# Patient Record
Sex: FEMALE | Race: OTHER | Employment: UNEMPLOYED | URBAN - METROPOLITAN AREA
[De-identification: names, ages, dates, MRNs, and addresses within clinical notes are randomized per-mention and may not be internally consistent; named-entity substitution may affect disease eponyms.]

---

## 2024-04-12 ENCOUNTER — HOSPITAL ENCOUNTER (EMERGENCY)
Age: 39
Discharge: HOME | End: 2024-04-12
Attending: EMERGENCY MEDICINE
Payer: MEDICAID

## 2024-04-12 VITALS
BODY MASS INDEX: 31.28 KG/M2 | OXYGEN SATURATION: 99 % | SYSTOLIC BLOOD PRESSURE: 133 MMHG | HEIGHT: 62 IN | RESPIRATION RATE: 16 BRPM | DIASTOLIC BLOOD PRESSURE: 88 MMHG | HEART RATE: 86 BPM | TEMPERATURE: 98.8 F | WEIGHT: 170 LBS

## 2024-04-12 DIAGNOSIS — K04.7 DENTAL INFECTION: Primary | ICD-10-CM

## 2024-04-12 PROCEDURE — 99283 EMERGENCY DEPT VISIT LOW MDM: CPT

## 2024-04-12 RX ORDER — PENICILLIN V POTASSIUM 500 MG/1
500 TABLET ORAL 4 TIMES DAILY
Qty: 40 TABLET | Refills: 0 | Status: SHIPPED | OUTPATIENT
Start: 2024-04-12

## 2024-04-12 RX ORDER — PENICILLIN V POTASSIUM 500 MG/1
500 TABLET ORAL 4 TIMES DAILY
Qty: 40 TABLET | Refills: 0 | Status: SHIPPED | OUTPATIENT
Start: 2024-04-12 | End: 2024-04-12

## 2024-04-12 RX ORDER — IBUPROFEN 800 MG/1
800 TABLET ORAL EVERY 8 HOURS PRN
Qty: 30 TABLET | Refills: 0 | Status: SHIPPED | OUTPATIENT
Start: 2024-04-12 | End: 2024-04-12

## 2024-04-12 RX ORDER — IBUPROFEN 800 MG/1
800 TABLET ORAL EVERY 8 HOURS PRN
Qty: 30 TABLET | Refills: 0 | Status: SHIPPED | OUTPATIENT
Start: 2024-04-12

## 2024-04-12 ASSESSMENT — PAIN SCALES - GENERAL: PAINLEVEL_OUTOF10: 10

## 2024-04-12 ASSESSMENT — PAIN DESCRIPTION - LOCATION: LOCATION: TEETH

## 2024-04-12 ASSESSMENT — PAIN - FUNCTIONAL ASSESSMENT: PAIN_FUNCTIONAL_ASSESSMENT: 0-10

## 2024-04-12 ASSESSMENT — PAIN DESCRIPTION - ORIENTATION: ORIENTATION: RIGHT

## 2024-04-12 NOTE — DISCHARGE INSTRUCTIONS
Take antibiotic as directed    Take Motrin as directed as needed for pain    Cool compresses to the area    Rinse mouth out several times with warm salt water    Use Magic mouthwash as directed    Follow-up with your dentist on April 28 as scheduled    If your symptoms worsen or any new or concerning symptoms arise please return to emergency department immediately  
No assistance needed

## 2024-04-12 NOTE — ED PROVIDER NOTES
[facility] Emergency Department  [address]  [facility] [address]  Phone: [phone]  Fax: [phone]      Attending Physician Attestation    Based on the medical record, the care appears appropriate. I was personally available for consultation in the Emergency Department. I did have a discussion with our midlevel provider regarding the care of this patient.  I reviewed the mid level provider's note and agree with the documented findings and plan of care.   I have reviewed the emergency nurses triage note. I agree with the chief complaint, past medical history, past surgical history, allergies, medications, social and family history as documented unless otherwise noted below.       CHIEF COMPLAINT       Chief Complaint   Patient presents with    Dental Pain     Pt arrives with co right upper tooth pain x 3 days. Pt unable to get into dentist          PAST MEDICAL HISTORY    has a past medical history of Bipolar disorder (HCC), Lumbago, Nephrolithiasis, Obesity, and Syncope, cardiogenic.    SURGICAL HISTORY      has a past surgical history that includes  section and Dilation and curettage of uterus.    CURRENT MEDICATIONS       Current Discharge Medication List          ALLERGIES     has No Known Allergies.      FINAL IMPRESSION      1. Dental infection          DISPOSITION/PLAN   DISPOSITION Decision To Discharge 2024 12:00:05 PM      Condition on Disposition    Improved    PATIENT REFERRED TO:  YOUR DENTIST ON THE 28 as scheduled      KEEP DENTIST APPOINTMENT      DISCHARGE MEDICATIONS:  Current Discharge Medication List        START taking these medications    Details   ibuprofen (IBU) 800 MG tablet Take 1 tablet by mouth every 8 hours as needed for Pain  Qty: 30 tablet, Refills: 0      Magic Mouthwash (MIRACLE MOUTHWASH) Swish and spit 5 mLs 4 times daily as needed for Irritation  Qty: 240 mL, Refills: 0      penicillin v potassium (VEETID) 500 MG tablet Take 1

## 2024-04-12 NOTE — ED PROVIDER NOTES
[facility] EMERGENCY DEPARTMENT  EMERGENCY DEPARTMENT ENCOUNTER      Pt Name: Susannah Caba  MRN: [id number]  Birthdate 1985  Date of evaluation: 4/12/2024  Provider: NICKO Ryan CNP    CHIEF COMPLAINT       Chief Complaint   Patient presents with    Dental Pain     Pt arrives with co right upper tooth pain x 3 days. Pt unable to get into dentist          HISTORY OF PRESENT ILLNESS   (Location/Symptom, Timing/Onset, Context/Setting, Quality, Duration, Modifying Factors, Severity)  Note limiting factors.   Susannah Caba is a 38 y.o. female who presents to the emergency department for evaluation of right upper jaw/molar dental pain.  Patient states her symptoms have been ongoing for the past 3 days, but she noticed today that she had some facial swelling.  She states she has been unable to get into the dentist, but she does have an appointment coming up 4/28/2024.  She states she has been using NyQuil to treat the discomfort and rinsing with Listerine.  Denies any fevers or chills.        Nursing Notes were reviewed.    REVIEW OF SYSTEMS       Constitutional: No fevers or chills   HEENT: No sore throat, rhinorrhea, or earache + dental pain  Eyes: No blurry vision or double vision no drainage   Cardiovascular: No chest pain or tachycardia   Respiratory: No wheezing or shortness of breath no cough   Gastrointestinal: No nausea, vomiting, diarrhea, constipation, or abdominal pain   : No hematuria or dysuria   Musculoskeletal: No swelling or pain   Skin: No rash   Neurological: No focal neurologic complaints, paresthesias, weakness, or headache      Except as noted above the remainder of the review of systems was reviewed and negative.       PAST MEDICAL HISTORY     Past Medical History:   Diagnosis Date    Bipolar disorder (HCC)     Lumbago     Nephrolithiasis     Obesity     Syncope, cardiogenic        SURGICAL HISTORY       Past Surgical History:   Procedure Laterality Date

## 2024-05-17 ENCOUNTER — OFFICE VISIT (OUTPATIENT)
Dept: FAMILY MEDICINE CLINIC | Age: 39
End: 2024-05-17
Payer: MEDICAID

## 2024-05-17 VITALS
SYSTOLIC BLOOD PRESSURE: 118 MMHG | HEART RATE: 69 BPM | OXYGEN SATURATION: 100 % | BODY MASS INDEX: 33.49 KG/M2 | DIASTOLIC BLOOD PRESSURE: 76 MMHG | HEIGHT: 62 IN | WEIGHT: 182 LBS

## 2024-05-17 DIAGNOSIS — Z13.29 THYROID DISORDER SCREEN: ICD-10-CM

## 2024-05-17 DIAGNOSIS — E55.9 VITAMIN D DEFICIENCY: ICD-10-CM

## 2024-05-17 DIAGNOSIS — E53.8 VITAMIN B12 DEFICIENCY: ICD-10-CM

## 2024-05-17 DIAGNOSIS — Z86.39 HISTORY OF HYPERGLYCEMIA: ICD-10-CM

## 2024-05-17 DIAGNOSIS — E66.09 CLASS 1 OBESITY DUE TO EXCESS CALORIES WITHOUT SERIOUS COMORBIDITY WITH BODY MASS INDEX (BMI) OF 33.0 TO 33.9 IN ADULT: Chronic | ICD-10-CM

## 2024-05-17 DIAGNOSIS — Z13.0 SCREENING FOR IRON DEFICIENCY ANEMIA: ICD-10-CM

## 2024-05-17 DIAGNOSIS — Z76.89 ESTABLISHING CARE WITH NEW DOCTOR, ENCOUNTER FOR: Primary | ICD-10-CM

## 2024-05-17 DIAGNOSIS — K21.9 GASTROESOPHAGEAL REFLUX DISEASE WITHOUT ESOPHAGITIS: Chronic | ICD-10-CM

## 2024-05-17 DIAGNOSIS — Z13.220 SCREENING FOR HYPERLIPIDEMIA: ICD-10-CM

## 2024-05-17 DIAGNOSIS — Z13.6 SCREENING FOR CARDIOVASCULAR CONDITION: ICD-10-CM

## 2024-05-17 PROBLEM — M51.369 DEGENERATION OF LUMBAR INTERVERTEBRAL DISC: Status: ACTIVE | Noted: 2024-05-17

## 2024-05-17 PROBLEM — Z80.0 FAMILY HISTORY OF MALIGNANT NEOPLASM OF COLON: Status: ACTIVE | Noted: 2019-05-03

## 2024-05-17 PROBLEM — M47.816 ARTHRITIS OF FACET JOINT OF LUMBAR SPINE: Status: ACTIVE | Noted: 2024-05-17

## 2024-05-17 PROBLEM — M51.36 DEGENERATION OF LUMBAR INTERVERTEBRAL DISC: Status: ACTIVE | Noted: 2024-05-17

## 2024-05-17 PROBLEM — Z84.89: Status: ACTIVE | Noted: 2019-05-03

## 2024-05-17 PROBLEM — Q76.49 BERTOLOTTI'S SYNDROME: Status: ACTIVE | Noted: 2024-05-17

## 2024-05-17 PROCEDURE — 99204 OFFICE O/P NEW MOD 45 MIN: CPT

## 2024-05-17 RX ORDER — PHENTERMINE HYDROCHLORIDE 37.5 MG/1
37.5 CAPSULE ORAL EVERY MORNING
Qty: 30 CAPSULE | Refills: 0 | Status: SHIPPED | OUTPATIENT
Start: 2024-05-17 | End: 2024-06-16

## 2024-05-17 RX ORDER — OMEPRAZOLE 20 MG/1
20 CAPSULE, DELAYED RELEASE ORAL
Qty: 30 CAPSULE | Refills: 0 | Status: SHIPPED | OUTPATIENT
Start: 2024-05-17

## 2024-05-17 SDOH — ECONOMIC STABILITY: INCOME INSECURITY: HOW HARD IS IT FOR YOU TO PAY FOR THE VERY BASICS LIKE FOOD, HOUSING, MEDICAL CARE, AND HEATING?: NOT HARD AT ALL

## 2024-05-17 SDOH — ECONOMIC STABILITY: FOOD INSECURITY: WITHIN THE PAST 12 MONTHS, THE FOOD YOU BOUGHT JUST DIDN'T LAST AND YOU DIDN'T HAVE MONEY TO GET MORE.: NEVER TRUE

## 2024-05-17 SDOH — ECONOMIC STABILITY: FOOD INSECURITY: WITHIN THE PAST 12 MONTHS, YOU WORRIED THAT YOUR FOOD WOULD RUN OUT BEFORE YOU GOT MONEY TO BUY MORE.: NEVER TRUE

## 2024-05-17 SDOH — ECONOMIC STABILITY: HOUSING INSECURITY
IN THE LAST 12 MONTHS, WAS THERE A TIME WHEN YOU DID NOT HAVE A STEADY PLACE TO SLEEP OR SLEPT IN A SHELTER (INCLUDING NOW)?: NO

## 2024-05-17 ASSESSMENT — ENCOUNTER SYMPTOMS
CHEST TIGHTNESS: 0
EYE ITCHING: 0
SINUS PAIN: 0
TROUBLE SWALLOWING: 0
SORE THROAT: 0
SHORTNESS OF BREATH: 0
DIARRHEA: 0
VOMITING: 0
EYE DISCHARGE: 0
ABDOMINAL PAIN: 0
WHEEZING: 0
SINUS PRESSURE: 0
NAUSEA: 0

## 2024-05-17 ASSESSMENT — PATIENT HEALTH QUESTIONNAIRE - PHQ9
8. MOVING OR SPEAKING SO SLOWLY THAT OTHER PEOPLE COULD HAVE NOTICED. OR THE OPPOSITE, BEING SO FIGETY OR RESTLESS THAT YOU HAVE BEEN MOVING AROUND A LOT MORE THAN USUAL: NOT AT ALL
10. IF YOU CHECKED OFF ANY PROBLEMS, HOW DIFFICULT HAVE THESE PROBLEMS MADE IT FOR YOU TO DO YOUR WORK, TAKE CARE OF THINGS AT HOME, OR GET ALONG WITH OTHER PEOPLE: NOT DIFFICULT AT ALL
SUM OF ALL RESPONSES TO PHQ9 QUESTIONS 1 & 2: 0
6. FEELING BAD ABOUT YOURSELF - OR THAT YOU ARE A FAILURE OR HAVE LET YOURSELF OR YOUR FAMILY DOWN: NOT AT ALL
7. TROUBLE CONCENTRATING ON THINGS, SUCH AS READING THE NEWSPAPER OR WATCHING TELEVISION: SEVERAL DAYS
2. FEELING DOWN, DEPRESSED OR HOPELESS: NOT AT ALL
SUM OF ALL RESPONSES TO PHQ QUESTIONS 1-9: 3
SUM OF ALL RESPONSES TO PHQ QUESTIONS 1-9: 3
4. FEELING TIRED OR HAVING LITTLE ENERGY: MORE THAN HALF THE DAYS
9. THOUGHTS THAT YOU WOULD BE BETTER OFF DEAD, OR OF HURTING YOURSELF: NOT AT ALL
3. TROUBLE FALLING OR STAYING ASLEEP: NOT AT ALL
5. POOR APPETITE OR OVEREATING: NOT AT ALL
SUM OF ALL RESPONSES TO PHQ QUESTIONS 1-9: 3
1. LITTLE INTEREST OR PLEASURE IN DOING THINGS: NOT AT ALL
SUM OF ALL RESPONSES TO PHQ QUESTIONS 1-9: 3

## 2024-05-17 NOTE — ASSESSMENT & PLAN NOTE
Uncontrolled, changes made today: Start omeprazole 20 mg daily, cut out acidic, spicy, fried foods and alcohol for the next 2 weeks, medication adherence emphasized, and lifestyle modifications recommended.

## 2024-05-17 NOTE — ASSESSMENT & PLAN NOTE
Uncontrolled, changes made today: Start Adipex daily, medication adherence emphasized, and lifestyle modifications recommended.    Weight loss education today includes:  Nutrition as a mainstream of weight loss therapy. [place] about label reading.  Carbs: good carbs and bad carbs, importance of carb counting, incorporation of protein with each meal to reduce Glycemic index, importance of portions.  Fats: Good fats and bad fats, meal planning and supplements.  Physical activity: advised to exercise 5-7 days a week 30-60 mins at least.

## 2024-05-17 NOTE — PROGRESS NOTES
[facility]   [address]  [address]  2024    Susannah Caba is a 38 y.o. female who presents today for her medical conditions and/or complaints as noted below.    Susannah Caba is scheduled today for New Patient and Weight Loss (Medication)  .      HPI:     Here to establish care. Was seeing a provider previously for weight loss medication, Adipex. She has been on it on and off for almost 1 year. She was doing very good on the adipex. She has been off of it since February and has gained some weight back. She also complains of back pain. She sees PT and pain management in the [facility]. She also complains of very frequent GERD symptoms, and feels as though her food sits in her stomach. She has increased fiber intake and now has BM's multiple times per week. Prior to fiber increase she was having a BM maybe 1 time per week. She states she has had a GI work up with a colonoscopy and a gastric emptying study, and reports being cleared     Patient has no further concerns for today's visit.  Previous notes reviewed in the chart.    Vitals:    24 0915   BP: 118/76   Pulse: 69   SpO2: 100%   Weight: 82.6 kg (182 lb)   Height: 1.575 m (5' 2\")      Past Medical History:   Diagnosis Date    Bipolar disorder (HCC)     Lumbago     Nephrolithiasis     Obesity     Syncope, cardiogenic       Past Surgical History:   Procedure Laterality Date     SECTION      DILATION AND CURETTAGE OF UTERUS       Family History   Problem Relation Age of Onset    Diabetes Other     Heart Disease Other     Cancer Other      Social History     Tobacco Use    Smoking status: Never    Smokeless tobacco: Never   Substance Use Topics    Alcohol use: No      Current Outpatient Medications   Medication Sig Dispense Refill    omeprazole (PRILOSEC) 20 MG delayed release capsule Take 1 capsule by mouth every morning (before breakfast) 30 capsule 0    phentermine (ADIPEX-P) 37.5 MG capsule Take 1

## 2024-06-19 LAB
ALBUMIN SERPL-MCNC: 4.1 G/DL
ALP BLD-CCNC: 95 U/L
ALT SERPL-CCNC: 23 U/L
ANION GAP SERPL CALCULATED.3IONS-SCNC: 11 MMOL/L
AST SERPL-CCNC: 32 U/L
BASOPHILS ABSOLUTE: 0 /ΜL
BASOPHILS RELATIVE PERCENT: 0.3 %
BILIRUB SERPL-MCNC: 0.6 MG/DL (ref 0.1–1.4)
BUN BLDV-MCNC: 10 MG/DL
CALCIUM SERPL-MCNC: 9 MG/DL
CHLORIDE BLD-SCNC: 104 MMOL/L
CHOLESTEROL, FASTING: 185
CO2: 23 MMOL/L
CREAT SERPL-MCNC: 0.6 MG/DL
EGFR: >60
EOSINOPHILS ABSOLUTE: 0.3 /ΜL
EOSINOPHILS RELATIVE PERCENT: 5.2 %
ESTIMATED AVERAGE GLUCOSE: 97
GLUCOSE BLD-MCNC: 97 MG/DL
HBA1C MFR BLD: 5.4 %
HCT VFR BLD CALC: 32.4 % (ref 36–46)
HDLC SERPL-MCNC: 44 MG/DL (ref 35–70)
HEMOGLOBIN: 9.8 G/DL (ref 12–16)
LDL CHOLESTEROL CALCULATED: 115 MG/DL (ref 0–160)
LYMPHOCYTES ABSOLUTE: 2.1 /ΜL
LYMPHOCYTES RELATIVE PERCENT: 33.9 %
MCH RBC QN AUTO: 22.4 PG
MCHC RBC AUTO-ENTMCNC: 30.2 G/DL
MCV RBC AUTO: 74 FL
MONOCYTES ABSOLUTE: 0.4 /ΜL
MONOCYTES RELATIVE PERCENT: 7.3 %
NEUTROPHILS ABSOLUTE: 3.2 /ΜL
NEUTROPHILS RELATIVE PERCENT: 53.1 %
PDW BLD-RTO: 15.4 %
PLATELET # BLD: 289 K/ΜL
PMV BLD AUTO: 9 FL
POTASSIUM SERPL-SCNC: 4.1 MMOL/L
RBC # BLD: 4.38 10^6/ΜL
SODIUM BLD-SCNC: 138 MMOL/L
TOTAL PROTEIN: 7.2
TRIGLYCERIDE, FASTING: 132
TSH SERPL DL<=0.05 MIU/L-ACNC: 2.03 UIU/ML
VITAMIN B-12: 282
VITAMIN D 25-HYDROXY: 39.6
VITAMIN D2, 25 HYDROXY: NORMAL
VITAMIN D3,25 HYDROXY: NORMAL
WBC # BLD: 6.1 10^3/ML

## 2024-06-20 ENCOUNTER — OFFICE VISIT (OUTPATIENT)
Dept: FAMILY MEDICINE CLINIC | Age: 39
End: 2024-06-20
Payer: MEDICAID

## 2024-06-20 VITALS
HEART RATE: 82 BPM | BODY MASS INDEX: 31.36 KG/M2 | SYSTOLIC BLOOD PRESSURE: 132 MMHG | OXYGEN SATURATION: 99 % | WEIGHT: 177 LBS | DIASTOLIC BLOOD PRESSURE: 84 MMHG | HEIGHT: 63 IN

## 2024-06-20 DIAGNOSIS — E55.9 VITAMIN D DEFICIENCY: ICD-10-CM

## 2024-06-20 DIAGNOSIS — Z13.220 SCREENING FOR HYPERLIPIDEMIA: ICD-10-CM

## 2024-06-20 DIAGNOSIS — D50.0 IRON DEFICIENCY ANEMIA DUE TO CHRONIC BLOOD LOSS: Chronic | ICD-10-CM

## 2024-06-20 DIAGNOSIS — E66.09 CLASS 1 OBESITY DUE TO EXCESS CALORIES WITHOUT SERIOUS COMORBIDITY WITH BODY MASS INDEX (BMI) OF 33.0 TO 33.9 IN ADULT: Primary | ICD-10-CM

## 2024-06-20 DIAGNOSIS — E53.8 VITAMIN B12 DEFICIENCY: ICD-10-CM

## 2024-06-20 DIAGNOSIS — Z13.29 THYROID DISORDER SCREEN: ICD-10-CM

## 2024-06-20 DIAGNOSIS — Z13.6 SCREENING FOR CARDIOVASCULAR CONDITION: ICD-10-CM

## 2024-06-20 DIAGNOSIS — Z13.0 SCREENING FOR IRON DEFICIENCY ANEMIA: ICD-10-CM

## 2024-06-20 DIAGNOSIS — N92.1 MENORRHAGIA WITH IRREGULAR CYCLE: Chronic | ICD-10-CM

## 2024-06-20 DIAGNOSIS — E53.8 B12 DEFICIENCY: ICD-10-CM

## 2024-06-20 DIAGNOSIS — Z86.39 HISTORY OF HYPERGLYCEMIA: ICD-10-CM

## 2024-06-20 PROCEDURE — 99214 OFFICE O/P EST MOD 30 MIN: CPT

## 2024-06-20 RX ORDER — CYANOCOBALAMIN 1000 UG/ML
1000 INJECTION, SOLUTION INTRAMUSCULAR; SUBCUTANEOUS ONCE
Status: COMPLETED | OUTPATIENT
Start: 2024-06-20 | End: 2024-06-20

## 2024-06-20 RX ORDER — FERROUS SULFATE 325(65) MG
325 TABLET ORAL 2 TIMES DAILY
Qty: 60 TABLET | Refills: 5 | Status: SHIPPED | OUTPATIENT
Start: 2024-06-20

## 2024-06-20 RX ORDER — DICLOFENAC SODIUM 75 MG/1
75 TABLET, DELAYED RELEASE ORAL
COMMUNITY
Start: 2024-03-14 | End: 2024-06-20

## 2024-06-20 RX ORDER — PHENTERMINE HYDROCHLORIDE 37.5 MG/1
37.5 TABLET ORAL
Qty: 30 TABLET | Refills: 0 | Status: SHIPPED | OUTPATIENT
Start: 2024-06-20 | End: 2024-06-21

## 2024-06-20 RX ADMIN — CYANOCOBALAMIN 1000 MCG: 1000 INJECTION, SOLUTION INTRAMUSCULAR; SUBCUTANEOUS at 10:27

## 2024-06-20 ASSESSMENT — ENCOUNTER SYMPTOMS
COUGH: 0
TROUBLE SWALLOWING: 0
EYE ITCHING: 0
SHORTNESS OF BREATH: 1
EYE DISCHARGE: 0
NAUSEA: 0
WHEEZING: 0
DIARRHEA: 0
CHEST TIGHTNESS: 0
SINUS PRESSURE: 0
SINUS PAIN: 0
ABDOMINAL PAIN: 0
EYE REDNESS: 0
VOMITING: 0
SORE THROAT: 0

## 2024-06-20 ASSESSMENT — PATIENT HEALTH QUESTIONNAIRE - PHQ9
SUM OF ALL RESPONSES TO PHQ9 QUESTIONS 1 & 2: 0
SUM OF ALL RESPONSES TO PHQ QUESTIONS 1-9: 0
2. FEELING DOWN, DEPRESSED OR HOPELESS: NOT AT ALL
SUM OF ALL RESPONSES TO PHQ QUESTIONS 1-9: 0
1. LITTLE INTEREST OR PLEASURE IN DOING THINGS: NOT AT ALL

## 2024-06-20 NOTE — PROGRESS NOTES
[facility]   [address]  [address]  6/20/2024    Susannah Caba is a 38 y.o. female who presents today for her medical conditions and/or complaints as noted below.    Susannah Caba is scheduled today for Weight Loss and Medication Refill (ADIPEX)  .      HPI:     Patient is here today for a weight loss follow-up.  Patient has been on Adipex for approximate 1 month and has lost 5 pounds and denies any side effects.  She does note however that she feels as though this medication has helped her overall focus and completing daily tasks, as well as helping her sleep better.  She states in the past she has had some concern that she has undiagnosed ADHD.  She denies any tachycardia, palpitations, tremors, diaphoresis, chest pain, shortness of breath.  She continues to focus on a reduced calorie diet and is increasing physical activity as tolerated.  Recent lab work at the patient was able to provide on her phone to report it [facility], shows lab results indicating iron deficiency anemia with microcytic anemia, hemoglobin at 9.8.  It also showed a B12 of 282.  Patient states she has a long history of very heavy periods with large clots that extend for more than 1 week.  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  She states if she is going upstairs or walks for a prolonged period, she will have to stop and catch her breath sooner than her peers.  She denies any syncopal episodes, episodes of dizziness or lightheadedness.  Blood pressure in the office today is 132/84 with a pulse of 82, all of which is at goal range.    Patient has no further concerns for today's visit.  External labs reviewed to include CBC, CMP, B12, TSH, vitamin D.        Vitals:    06/20/24 0939   BP: 132/84   Pulse: 82   SpO2: 99%   Weight: 80.3 kg (177 lb)   Height: 1.6 m (5' 3\")      Past Medical History:   Diagnosis Date    Bipolar disorder (HCC)     Lumbago

## 2024-07-22 ENCOUNTER — OFFICE VISIT (OUTPATIENT)
Dept: FAMILY MEDICINE CLINIC | Age: 39
End: 2024-07-22
Payer: MEDICAID

## 2024-07-22 VITALS
WEIGHT: 177 LBS | HEART RATE: 73 BPM | OXYGEN SATURATION: 99 % | HEIGHT: 63 IN | DIASTOLIC BLOOD PRESSURE: 70 MMHG | BODY MASS INDEX: 31.36 KG/M2 | SYSTOLIC BLOOD PRESSURE: 122 MMHG

## 2024-07-22 DIAGNOSIS — E66.09 CLASS 1 OBESITY DUE TO EXCESS CALORIES WITHOUT SERIOUS COMORBIDITY WITH BODY MASS INDEX (BMI) OF 33.0 TO 33.9 IN ADULT: Primary | Chronic | ICD-10-CM

## 2024-07-22 DIAGNOSIS — L30.9 ECZEMA, UNSPECIFIED TYPE: Chronic | ICD-10-CM

## 2024-07-22 DIAGNOSIS — E53.8 B12 DEFICIENCY: ICD-10-CM

## 2024-07-22 PROCEDURE — 96372 THER/PROPH/DIAG INJ SC/IM: CPT

## 2024-07-22 PROCEDURE — 99214 OFFICE O/P EST MOD 30 MIN: CPT

## 2024-07-22 RX ORDER — TRIAMCINOLONE ACETONIDE 0.25 MG/G
CREAM TOPICAL
Qty: 1 EACH | Refills: 1 | Status: SHIPPED | OUTPATIENT
Start: 2024-07-22

## 2024-07-22 RX ORDER — CYANOCOBALAMIN 1000 UG/ML
1000 INJECTION, SOLUTION INTRAMUSCULAR; SUBCUTANEOUS ONCE
Status: COMPLETED | OUTPATIENT
Start: 2024-07-22 | End: 2024-07-22

## 2024-07-22 RX ORDER — PHENTERMINE HYDROCHLORIDE 37.5 MG/1
37.5 CAPSULE ORAL EVERY MORNING
Qty: 30 CAPSULE | Refills: 0 | Status: SHIPPED | OUTPATIENT
Start: 2024-07-22 | End: 2024-08-21

## 2024-07-22 RX ADMIN — CYANOCOBALAMIN 1000 MCG: 1000 INJECTION, SOLUTION INTRAMUSCULAR; SUBCUTANEOUS at 10:15

## 2024-07-22 ASSESSMENT — ENCOUNTER SYMPTOMS
NAUSEA: 0
SORE THROAT: 0
COUGH: 0
SHORTNESS OF BREATH: 0
WHEEZING: 0
SINUS PRESSURE: 0
EYE DISCHARGE: 0
DIARRHEA: 0
VOMITING: 0
EYE REDNESS: 0
ABDOMINAL PAIN: 0
SINUS PAIN: 0
TROUBLE SWALLOWING: 0
EYE ITCHING: 0
CHEST TIGHTNESS: 0

## 2024-07-22 NOTE — ASSESSMENT & PLAN NOTE
Uncontrolled, changes made today: continue Adipex daily, medication adherence emphasized, and lifestyle modifications recommended.    Weight loss education today includes:  Nutrition as a mainstream of weight loss therapy. [place] about label reading.  Carbs: good carbs and bad carbs, importance of carb counting, incorporation of protein with each meal to reduce Glycemic index, importance of portions.  Fats: Good fats and bad fats, meal planning and supplements.  Physical activity: advised to exercise 5-7 days a week 30-60 mins at least.

## 2024-07-22 NOTE — PROGRESS NOTES
mistake has been identified and corrected by editing.    Electronically signed by NICKO Diaz CNP, NICKO-CNP on 7/22/2024 at 10:14 AM

## 2024-07-22 NOTE — ASSESSMENT & PLAN NOTE
Uncontrolled, changes made today: start kenalog PRN for flares and medication adherence emphasized

## 2024-08-30 ENCOUNTER — OFFICE VISIT (OUTPATIENT)
Dept: FAMILY MEDICINE CLINIC | Age: 39
End: 2024-08-30
Payer: MEDICAID

## 2024-08-30 VITALS
OXYGEN SATURATION: 98 % | DIASTOLIC BLOOD PRESSURE: 82 MMHG | HEART RATE: 92 BPM | HEIGHT: 63 IN | WEIGHT: 170 LBS | BODY MASS INDEX: 30.12 KG/M2 | SYSTOLIC BLOOD PRESSURE: 116 MMHG

## 2024-08-30 DIAGNOSIS — R41.840 CONCENTRATION DEFICIT: ICD-10-CM

## 2024-08-30 DIAGNOSIS — E66.09 CLASS 1 OBESITY DUE TO EXCESS CALORIES WITHOUT SERIOUS COMORBIDITY WITH BODY MASS INDEX (BMI) OF 33.0 TO 33.9 IN ADULT: Primary | ICD-10-CM

## 2024-08-30 PROCEDURE — 99213 OFFICE O/P EST LOW 20 MIN: CPT

## 2024-08-30 NOTE — PROGRESS NOTES
[facility]   [address]  [address]  9/3/2024    Susannah Caba is a 39 y.o. female who presents today for her medical conditions and/or complaints as noted below.    Susannah Caba is scheduled today for Weight Loss  .      HPI:     Susannah Caba is a 39 y.o. female here for discussion regarding weight loss. She has noted a weight loss of approximately 12 pounds over the last 3 months. She feels ideal weight is 160 pounds.  History of eating disorders: none. Previous treatments for obesity include commercial weight loss program: , self-directed dieting, and very low calorie diet. Obesity associated medical conditions: none. Obesity associated medications: none. Cardiovascular risk factors besides obesity: obesity (BMI >= 30 kg/m2). She has last 6.9 pounds of her original body weight. She feels that the medication has also helped her focus and sleep better as well. She has never been diagnosed with ADHD in the past.        Vitals:    24 1355   BP: 116/82   Pulse: 92   SpO2: 98%   Weight: 77.1 kg (170 lb)   Height: 1.6 m (5' 3\")      Past Medical History:   Diagnosis Date    Bipolar disorder (HCC)     Lumbago     Nephrolithiasis     Obesity     Syncope, cardiogenic       Past Surgical History:   Procedure Laterality Date     SECTION      DILATION AND CURETTAGE OF UTERUS       Family History   Problem Relation Age of Onset    Diabetes Other     Heart Disease Other     Cancer Other      Social History     Tobacco Use    Smoking status: Never    Smokeless tobacco: Never   Substance Use Topics    Alcohol use: No      Current Outpatient Medications   Medication Sig Dispense Refill    Phentermine-Topiramate 15-92 MG CP24 Take 1 each by mouth daily for 30 days. Max Daily Amount: 1 each 30 capsule 0    buPROPion (WELLBUTRIN XL) 150 MG extended release tablet Take 1 tablet by mouth every morning 30 tablet 0    triamcinolone (KENALOG) 0.025 % cream Apply Topically to the

## 2024-09-03 PROBLEM — R41.840 CONCENTRATION DEFICIT: Status: ACTIVE | Noted: 2024-09-03

## 2024-09-03 RX ORDER — BUPROPION HYDROCHLORIDE 150 MG/1
150 TABLET ORAL EVERY MORNING
Qty: 30 TABLET | Refills: 0 | Status: SHIPPED | OUTPATIENT
Start: 2024-09-03

## 2024-09-03 NOTE — ASSESSMENT & PLAN NOTE
Chronic, not at goal (unstable), changes made today: Start Wellbutrin 150 mg, medication adherence emphasized, and lifestyle modifications recommended.  We did discuss the likelihood of increasing to 300 mg daily in order to obtain the most benefit for her concentration deficit.  Patient also encouraged to seek out psychology services to get an official screening for potential undiagnosed ADHD.

## 2024-09-03 NOTE — ASSESSMENT & PLAN NOTE
Uncontrolled, changes made today: Start low-dose Adipex daily, medication adherence emphasized, and lifestyle modifications recommended.    Weight loss education today includes:  Nutrition as a mainstream of weight loss therapy. [place] about label reading.  Carbs: good carbs and bad carbs, importance of carb counting, incorporation of protein with each meal to reduce Glycemic index, importance of portions.  Fats: Good fats and bad fats, meal planning and supplements.  Physical activity: advised to exercise 5-7 days a week 30-60 mins at least.

## 2024-09-27 ENCOUNTER — OFFICE VISIT (OUTPATIENT)
Dept: FAMILY MEDICINE CLINIC | Age: 39
End: 2024-09-27
Payer: MEDICAID

## 2024-09-27 VITALS
BODY MASS INDEX: 30.65 KG/M2 | HEIGHT: 63 IN | OXYGEN SATURATION: 100 % | DIASTOLIC BLOOD PRESSURE: 74 MMHG | HEART RATE: 81 BPM | WEIGHT: 173 LBS | SYSTOLIC BLOOD PRESSURE: 110 MMHG

## 2024-09-27 DIAGNOSIS — R41.840 CONCENTRATION DEFICIT: ICD-10-CM

## 2024-09-27 DIAGNOSIS — E66.811 CLASS 1 OBESITY DUE TO EXCESS CALORIES WITHOUT SERIOUS COMORBIDITY WITH BODY MASS INDEX (BMI) OF 33.0 TO 33.9 IN ADULT: ICD-10-CM

## 2024-09-27 DIAGNOSIS — E66.09 CLASS 1 OBESITY DUE TO EXCESS CALORIES WITHOUT SERIOUS COMORBIDITY WITH BODY MASS INDEX (BMI) OF 33.0 TO 33.9 IN ADULT: ICD-10-CM

## 2024-09-27 PROCEDURE — 99214 OFFICE O/P EST MOD 30 MIN: CPT

## 2024-09-27 NOTE — PROGRESS NOTES
[facility]   [address]  [address]  10/3/2024    Susannah Caba is a 39 y.o. female who presents today for her medical conditions and/or complaints as noted below.    Susannah Caba is scheduled today for Weight Loss  .      HPI:     She is unsure if she started the reduced dose of Phentermine. PDMP does not show the medication being picked up from the pharmacy.    Name: Susannah Caba  Date: 10/3/2024    Weight: Wt Readings from Last 4 Encounters:  24 : 78.5 kg (173 lb)  24 : 77.1 kg (170 lb)  24 : 80.3 kg (177 lb)  24 : 80.3 kg (177 lb)    Total Loss: 4lbs    How much water do you drink each day? 60 oz    Exercise type? walking 30 minutes/day- 3 days/week    Are you watching your diet and if so what are you doing? Yes, focusing on reducing eliminating sugary foods and beverages.    Weight loss medication? Yes    Notes/Side Effects: Denies          Vitals:    24 1323   BP: 110/74   Pulse: 81   SpO2: 100%   Weight: 78.5 kg (173 lb)   Height: 1.6 m (5' 3\")      Past Medical History:   Diagnosis Date    Bipolar disorder (HCC)     Lumbago     Nephrolithiasis     Obesity     Syncope, cardiogenic       Past Surgical History:   Procedure Laterality Date     SECTION      DILATION AND CURETTAGE OF UTERUS       Family History   Problem Relation Age of Onset    Diabetes Other     Heart Disease Other     Cancer Other      Social History     Tobacco Use    Smoking status: Never    Smokeless tobacco: Never   Substance Use Topics    Alcohol use: No      Current Outpatient Medications   Medication Sig Dispense Refill    triamcinolone (KENALOG) 0.025 % cream Apply Topically to the affected area BID 1 each 1    ferrous sulfate (IRON 325) 325 (65 Fe) MG tablet Take 1 tablet by mouth 2 times daily 60 tablet 5    omeprazole (PRILOSEC) 20 MG delayed release capsule Take 1 capsule by mouth every morning (before breakfast) 30 capsule 0    Phentermine-Topiramate

## 2024-10-03 PROBLEM — R41.840 CONCENTRATION DEFICIT: Chronic | Status: ACTIVE | Noted: 2024-09-03

## 2024-10-03 RX ORDER — BUPROPION HYDROCHLORIDE 300 MG/1
300 TABLET ORAL EVERY MORNING
Qty: 30 TABLET | Refills: 0 | Status: SHIPPED | OUTPATIENT
Start: 2024-10-03 | End: 2024-11-02

## 2024-10-03 ASSESSMENT — ENCOUNTER SYMPTOMS
SHORTNESS OF BREATH: 0
DIARRHEA: 0
SORE THROAT: 0
VOMITING: 0
SINUS PAIN: 0
TROUBLE SWALLOWING: 0
EYE ITCHING: 0
COUGH: 0
CHEST TIGHTNESS: 0
EYE DISCHARGE: 0
EYE REDNESS: 0
NAUSEA: 0
ABDOMINAL PAIN: 0
WHEEZING: 0
SINUS PRESSURE: 0

## 2024-10-03 NOTE — ASSESSMENT & PLAN NOTE
Chronic, not at goal (unstable), changes made today: Start Wellbutrin 300 mg, medication adherence emphasized, and lifestyle modifications recommended.   Patient also encouraged to seek out psychology services to get an official screening for potential undiagnosed ADHD.

## 2024-10-07 ENCOUNTER — TELEPHONE (OUTPATIENT)
Dept: FAMILY MEDICINE CLINIC | Age: 39
End: 2024-10-07

## 2024-10-07 DIAGNOSIS — E66.811 CLASS 1 OBESITY DUE TO EXCESS CALORIES WITHOUT SERIOUS COMORBIDITY WITH BODY MASS INDEX (BMI) OF 33.0 TO 33.9 IN ADULT: Primary | ICD-10-CM

## 2024-10-07 DIAGNOSIS — E66.09 CLASS 1 OBESITY DUE TO EXCESS CALORIES WITHOUT SERIOUS COMORBIDITY WITH BODY MASS INDEX (BMI) OF 33.0 TO 33.9 IN ADULT: Primary | ICD-10-CM

## 2024-10-07 RX ORDER — PHENTERMINE HYDROCHLORIDE 37.5 MG/1
37.5 TABLET ORAL
Qty: 30 TABLET | Refills: 0 | Status: SHIPPED | OUTPATIENT
Start: 2024-10-07 | End: 2024-10-10

## 2024-10-07 NOTE — TELEPHONE ENCOUNTER
The patient tried to  the phentermine-topiramate cost is $ 253.71 and  PA was denied ,insurance does not cover weight loss medication , she is willing to go back to on regular adipex . Can you send over a new script .

## 2024-10-08 ENCOUNTER — TELEPHONE (OUTPATIENT)
Dept: FAMILY MEDICINE CLINIC | Age: 39
End: 2024-10-08

## 2024-10-08 DIAGNOSIS — E66.09 CLASS 1 OBESITY DUE TO EXCESS CALORIES WITHOUT SERIOUS COMORBIDITY WITH BODY MASS INDEX (BMI) OF 33.0 TO 33.9 IN ADULT: Primary | ICD-10-CM

## 2024-10-08 DIAGNOSIS — E66.811 CLASS 1 OBESITY DUE TO EXCESS CALORIES WITHOUT SERIOUS COMORBIDITY WITH BODY MASS INDEX (BMI) OF 33.0 TO 33.9 IN ADULT: Primary | ICD-10-CM

## 2024-10-10 RX ORDER — PHENTERMINE HYDROCHLORIDE 37.5 MG/1
37.5 CAPSULE ORAL EVERY MORNING
Qty: 30 CAPSULE | Refills: 0 | Status: SHIPPED | OUTPATIENT
Start: 2024-10-10 | End: 2024-11-09

## 2024-11-13 ENCOUNTER — OFFICE VISIT (OUTPATIENT)
Dept: FAMILY MEDICINE CLINIC | Age: 39
End: 2024-11-13
Payer: MEDICAID

## 2024-11-13 VITALS
DIASTOLIC BLOOD PRESSURE: 60 MMHG | WEIGHT: 174 LBS | HEIGHT: 63 IN | SYSTOLIC BLOOD PRESSURE: 112 MMHG | TEMPERATURE: 97 F | BODY MASS INDEX: 30.83 KG/M2

## 2024-11-13 DIAGNOSIS — E66.811 CLASS 1 OBESITY DUE TO EXCESS CALORIES WITHOUT SERIOUS COMORBIDITY WITH BODY MASS INDEX (BMI) OF 33.0 TO 33.9 IN ADULT: Primary | ICD-10-CM

## 2024-11-13 DIAGNOSIS — E66.09 CLASS 1 OBESITY DUE TO EXCESS CALORIES WITHOUT SERIOUS COMORBIDITY WITH BODY MASS INDEX (BMI) OF 33.0 TO 33.9 IN ADULT: Primary | ICD-10-CM

## 2024-11-13 DIAGNOSIS — R41.840 CONCENTRATION DEFICIT: ICD-10-CM

## 2024-11-13 PROCEDURE — 99214 OFFICE O/P EST MOD 30 MIN: CPT

## 2024-11-13 RX ORDER — BUPROPION HYDROCHLORIDE 300 MG/1
300 TABLET ORAL EVERY MORNING
Qty: 90 TABLET | Refills: 1 | Status: SHIPPED | OUTPATIENT
Start: 2024-11-13 | End: 2025-05-12

## 2024-11-13 RX ORDER — PHENTERMINE HYDROCHLORIDE 37.5 MG/1
37.5 CAPSULE ORAL EVERY MORNING
Qty: 30 CAPSULE | Refills: 2 | Status: SHIPPED | OUTPATIENT
Start: 2024-11-13 | End: 2025-02-11

## 2024-11-13 SDOH — ECONOMIC STABILITY: TRANSPORTATION INSECURITY
IN THE PAST 12 MONTHS, HAS THE LACK OF TRANSPORTATION KEPT YOU FROM MEDICAL APPOINTMENTS OR FROM GETTING MEDICATIONS?: NO

## 2024-11-13 SDOH — ECONOMIC STABILITY: TRANSPORTATION INSECURITY
IN THE PAST 12 MONTHS, HAS LACK OF TRANSPORTATION KEPT YOU FROM MEETINGS, WORK, OR FROM GETTING THINGS NEEDED FOR DAILY LIVING?: NO

## 2024-11-13 SDOH — ECONOMIC STABILITY: FOOD INSECURITY: WITHIN THE PAST 12 MONTHS, THE FOOD YOU BOUGHT JUST DIDN'T LAST AND YOU DIDN'T HAVE MONEY TO GET MORE.: NEVER TRUE

## 2024-11-13 SDOH — ECONOMIC STABILITY: FOOD INSECURITY: WITHIN THE PAST 12 MONTHS, YOU WORRIED THAT YOUR FOOD WOULD RUN OUT BEFORE YOU GOT MONEY TO BUY MORE.: NEVER TRUE

## 2024-11-13 SDOH — ECONOMIC STABILITY: INCOME INSECURITY: IN THE LAST 12 MONTHS, WAS THERE A TIME WHEN YOU WERE NOT ABLE TO PAY THE MORTGAGE OR RENT ON TIME?: NO

## 2024-11-13 ASSESSMENT — PATIENT HEALTH QUESTIONNAIRE - PHQ9
2. FEELING DOWN, DEPRESSED OR HOPELESS: NOT AT ALL
3. TROUBLE FALLING OR STAYING ASLEEP: NOT AT ALL
SUM OF ALL RESPONSES TO PHQ QUESTIONS 1-9: 0
6. FEELING BAD ABOUT YOURSELF - OR THAT YOU ARE A FAILURE OR HAVE LET YOURSELF OR YOUR FAMILY DOWN: NOT AT ALL
10. IF YOU CHECKED OFF ANY PROBLEMS, HOW DIFFICULT HAVE THESE PROBLEMS MADE IT FOR YOU TO DO YOUR WORK, TAKE CARE OF THINGS AT HOME, OR GET ALONG WITH OTHER PEOPLE: NOT DIFFICULT AT ALL
SUM OF ALL RESPONSES TO PHQ QUESTIONS 1-9: 0
9. THOUGHTS THAT YOU WOULD BE BETTER OFF DEAD, OR OF HURTING YOURSELF: NOT AT ALL
4. FEELING TIRED OR HAVING LITTLE ENERGY: NOT AT ALL
SUM OF ALL RESPONSES TO PHQ QUESTIONS 1-9: 0
SUM OF ALL RESPONSES TO PHQ9 QUESTIONS 1 & 2: 0
8. MOVING OR SPEAKING SO SLOWLY THAT OTHER PEOPLE COULD HAVE NOTICED. OR THE OPPOSITE, BEING SO FIGETY OR RESTLESS THAT YOU HAVE BEEN MOVING AROUND A LOT MORE THAN USUAL: NOT AT ALL
SUM OF ALL RESPONSES TO PHQ QUESTIONS 1-9: 0
5. POOR APPETITE OR OVEREATING: NOT AT ALL
1. LITTLE INTEREST OR PLEASURE IN DOING THINGS: NOT AT ALL
7. TROUBLE CONCENTRATING ON THINGS, SUCH AS READING THE NEWSPAPER OR WATCHING TELEVISION: NOT AT ALL

## 2024-11-13 ASSESSMENT — SOCIAL DETERMINANTS OF HEALTH (SDOH): HOW HARD IS IT FOR YOU TO PAY FOR THE VERY BASICS LIKE FOOD, HOUSING, MEDICAL CARE, AND HEATING?: NOT HARD AT ALL

## 2024-11-13 NOTE — PROGRESS NOTES
[facility]   [address]  [address]  11/15/2024    Susannah Caba is a 39 y.o. female who presents today for her medical conditions and/or complaints as noted below.    Susannah Caba is scheduled today for Weight Loss  .      HPI:     History of Present Illness  The patient presents for evaluation of weight loss and concentration deficit.    She was prescribed Adipex and Wellbutrin, as her insurance did not cover the previous weight loss medication. Initially, she tried Wellbutrin alone to assess its effects. She reports that the combination of both medications has helped her maintain focus. She noticed an improvement in her condition after starting Adipex and expresses a desire to continue with Adipex and undergo testing for ADHD.     She is currently taking Adipex daily, but did not take it at all last month. She reports no side effects from Adipex, nor any issues with anxiety, panic attacks, tremors, or mood swings.  She has continued to maintain an 8 pound weight loss since starting the Adipex.    She also reports no side effects from Wellbutrin.    Patient has no further concerns for today's visit.      Vitals:    24 1527   BP: 112/60   Temp: 97 °F (36.1 °C)   Weight: 78.9 kg (174 lb)   Height: 1.6 m (5' 2.99\")      Past Medical History:   Diagnosis Date    Bipolar disorder (HCC)     Lumbago     Nephrolithiasis     Obesity     Syncope, cardiogenic       Past Surgical History:   Procedure Laterality Date     SECTION      DILATION AND CURETTAGE OF UTERUS       Family History   Problem Relation Age of Onset    Diabetes Other     Heart Disease Other     Cancer Other      Social History     Tobacco Use    Smoking status: Never    Smokeless tobacco: Never   Substance Use Topics    Alcohol use: No      Current Outpatient Medications   Medication Sig Dispense Refill    buPROPion (WELLBUTRIN XL) 300 MG extended release tablet Take 1 tablet by mouth every morning 90 tablet 1

## 2024-11-15 ASSESSMENT — ENCOUNTER SYMPTOMS
WHEEZING: 0
COUGH: 0
TROUBLE SWALLOWING: 0
NAUSEA: 0
SINUS PRESSURE: 0
VOMITING: 0
DIARRHEA: 0
SORE THROAT: 0
EYE REDNESS: 0
EYE ITCHING: 0
SHORTNESS OF BREATH: 0
ABDOMINAL PAIN: 0
CHEST TIGHTNESS: 0
SINUS PAIN: 0
EYE DISCHARGE: 0

## 2024-11-19 DIAGNOSIS — K21.9 GASTROESOPHAGEAL REFLUX DISEASE WITHOUT ESOPHAGITIS: Chronic | ICD-10-CM

## 2024-11-19 NOTE — TELEPHONE ENCOUNTER
Susannah Caba is calling to request a refill on the following medication(s):    Medication Request:  Requested Prescriptions     Pending Prescriptions Disp Refills    omeprazole (PRILOSEC) 20 MG delayed release capsule 30 capsule 0     Sig: Take 1 capsule by mouth every morning (before breakfast)       Last Visit Date (If Applicable):  11/13/2024    Next Visit Date:    Visit date not found

## 2024-12-12 ENCOUNTER — TELEPHONE (OUTPATIENT)
Dept: OBGYN CLINIC | Age: 39
End: 2024-12-12

## 2024-12-12 NOTE — TELEPHONE ENCOUNTER
Called pt.     1st attempt to call pt to schedule pt for referral    When called pt, phone stated not this caller is not able to take calls at this time. Does not ring or give option to leave VM

## 2024-12-19 DIAGNOSIS — K21.9 GASTROESOPHAGEAL REFLUX DISEASE WITHOUT ESOPHAGITIS: Chronic | ICD-10-CM

## 2024-12-19 NOTE — TELEPHONE ENCOUNTER
Susannah Caba is calling to request a refill on the following medication(s):    Medication Request:  Requested Prescriptions     Pending Prescriptions Disp Refills    omeprazole (PRILOSEC) 20 MG delayed release capsule [Pharmacy Med Name: OMEPRAZOLE DR 20 MG CAPSULE] 30 capsule 0     Sig: TAKE 1 CAPSULE BY MOUTH EVERY DAY IN THE MORNING BEFORE BREAKFAST       Last Visit Date (If Applicable):  11/13/2024    Next Visit Date:    Visit date not found

## 2025-02-23 DIAGNOSIS — K21.9 GASTROESOPHAGEAL REFLUX DISEASE WITHOUT ESOPHAGITIS: Chronic | ICD-10-CM

## 2025-02-24 RX ORDER — OMEPRAZOLE 20 MG/1
20 CAPSULE, DELAYED RELEASE ORAL
Qty: 30 CAPSULE | Refills: 2 | Status: SHIPPED | OUTPATIENT
Start: 2025-02-24

## 2025-04-18 ENCOUNTER — TELEPHONE (OUTPATIENT)
Dept: FAMILY MEDICINE CLINIC | Age: 40
End: 2025-04-18

## 2025-04-21 ENCOUNTER — HOSPITAL ENCOUNTER (OUTPATIENT)
Age: 40
Setting detail: SPECIMEN
Discharge: HOME | End: 2025-04-21

## 2025-04-21 ENCOUNTER — CLINICAL SUPPORT (OUTPATIENT)
Dept: FAMILY MEDICINE CLINIC | Age: 40
End: 2025-04-21

## 2025-04-21 DIAGNOSIS — R30.0 DYSURIA: Primary | ICD-10-CM

## 2025-04-21 DIAGNOSIS — R30.0 DYSURIA: ICD-10-CM

## 2025-04-21 DIAGNOSIS — B37.31 YEAST INFECTION OF THE VAGINA: Primary | ICD-10-CM

## 2025-04-21 SDOH — ECONOMIC STABILITY: FOOD INSECURITY: WITHIN THE PAST 12 MONTHS, THE FOOD YOU BOUGHT JUST DIDN'T LAST AND YOU DIDN'T HAVE MONEY TO GET MORE.: NEVER TRUE

## 2025-04-21 SDOH — ECONOMIC STABILITY: FOOD INSECURITY: WITHIN THE PAST 12 MONTHS, YOU WORRIED THAT YOUR FOOD WOULD RUN OUT BEFORE YOU GOT MONEY TO BUY MORE.: NEVER TRUE

## 2025-04-21 ASSESSMENT — PATIENT HEALTH QUESTIONNAIRE - PHQ9: DEPRESSION UNABLE TO ASSESS: PT REFUSES

## 2025-04-22 ENCOUNTER — RESULTS FOLLOW-UP (OUTPATIENT)
Dept: FAMILY MEDICINE CLINIC | Age: 40
End: 2025-04-22

## 2025-04-22 DIAGNOSIS — N76.0 BACTERIAL VAGINITIS: Primary | ICD-10-CM

## 2025-04-22 DIAGNOSIS — B96.89 BACTERIAL VAGINITIS: Primary | ICD-10-CM

## 2025-04-22 LAB
CANDIDA SPECIES: NEGATIVE
GARDNERELLA VAGINALIS: POSITIVE
SOURCE: ABNORMAL
TRICHOMONAS: NEGATIVE

## 2025-04-22 RX ORDER — METRONIDAZOLE 500 MG/1
500 TABLET ORAL 2 TIMES DAILY
Qty: 14 TABLET | Refills: 0 | Status: SHIPPED | OUTPATIENT
Start: 2025-04-22 | End: 2025-04-29

## 2025-05-05 ENCOUNTER — HOSPITAL ENCOUNTER (OUTPATIENT)
Age: 40
Setting detail: SPECIMEN
Discharge: HOME | End: 2025-05-05

## 2025-05-05 ENCOUNTER — OFFICE VISIT (OUTPATIENT)
Dept: FAMILY MEDICINE CLINIC | Age: 40
End: 2025-05-05

## 2025-05-05 VITALS
OXYGEN SATURATION: 100 % | HEART RATE: 84 BPM | BODY MASS INDEX: 31.36 KG/M2 | DIASTOLIC BLOOD PRESSURE: 89 MMHG | HEIGHT: 63 IN | SYSTOLIC BLOOD PRESSURE: 126 MMHG | WEIGHT: 177 LBS

## 2025-05-05 DIAGNOSIS — E53.8 VITAMIN B12 DEFICIENCY: ICD-10-CM

## 2025-05-05 DIAGNOSIS — E55.9 VITAMIN D DEFICIENCY: ICD-10-CM

## 2025-05-05 DIAGNOSIS — R53.83 FATIGUE, UNSPECIFIED TYPE: ICD-10-CM

## 2025-05-05 DIAGNOSIS — Z13.6 SCREENING FOR CARDIOVASCULAR CONDITION: ICD-10-CM

## 2025-05-05 DIAGNOSIS — Z86.39 HISTORY OF HYPERGLYCEMIA: ICD-10-CM

## 2025-05-05 DIAGNOSIS — Z13.220 SCREENING FOR HYPERLIPIDEMIA: ICD-10-CM

## 2025-05-05 DIAGNOSIS — R53.83 FATIGUE, UNSPECIFIED TYPE: Primary | ICD-10-CM

## 2025-05-05 DIAGNOSIS — Z13.29 THYROID DISORDER SCREEN: ICD-10-CM

## 2025-05-05 DIAGNOSIS — Z13.0 SCREENING FOR IRON DEFICIENCY ANEMIA: ICD-10-CM

## 2025-05-05 LAB
25(OH)D3 SERPL-MCNC: 24.7 NG/ML (ref 30–100)
ALBUMIN SERPL-MCNC: 4.3 G/DL (ref 3.5–5.2)
ALBUMIN/GLOB SERPL: 1.5 {RATIO} (ref 1–2.5)
ALP SERPL-CCNC: 108 U/L (ref 35–104)
ALT SERPL-CCNC: 19 U/L (ref 10–35)
ANION GAP SERPL CALCULATED.3IONS-SCNC: 12 MMOL/L (ref 9–16)
AST SERPL-CCNC: 24 U/L (ref 10–35)
BASOPHILS # BLD: 0 K/UL (ref 0–0.2)
BASOPHILS NFR BLD: 0 % (ref 0–2)
BILIRUB SERPL-MCNC: 0.2 MG/DL (ref 0–1.2)
BUN SERPL-MCNC: 10 MG/DL (ref 6–20)
CALCIUM SERPL-MCNC: 9.1 MG/DL (ref 8.6–10.4)
CHLORIDE SERPL-SCNC: 104 MMOL/L (ref 98–107)
CHOLEST SERPL-MCNC: 168 MG/DL (ref 0–199)
CHOLESTEROL/HDL RATIO: 3.8
CO2 SERPL-SCNC: 23 MMOL/L (ref 20–31)
CREAT SERPL-MCNC: 0.6 MG/DL (ref 0.6–0.9)
EOSINOPHIL # BLD: 0.24 K/UL (ref 0–0.44)
EOSINOPHILS RELATIVE PERCENT: 3 % (ref 1–4)
ERYTHROCYTE [DISTWIDTH] IN BLOOD BY AUTOMATED COUNT: 15.7 % (ref 11.8–14.4)
EST. AVERAGE GLUCOSE BLD GHB EST-MCNC: 114 MG/DL
GFR, ESTIMATED: >90 ML/MIN/1.73M2
GLUCOSE SERPL-MCNC: 97 MG/DL (ref 74–99)
HBA1C MFR BLD: 5.6 % (ref 4–6)
HCT VFR BLD AUTO: 35.3 % (ref 36.3–47.1)
HDLC SERPL-MCNC: 44 MG/DL
HGB BLD-MCNC: 9.9 G/DL (ref 11.9–15.1)
IMM GRANULOCYTES # BLD AUTO: 0 K/UL (ref 0–0.3)
IMM GRANULOCYTES NFR BLD: 0 %
IRON SATN MFR SERPL: 5 % (ref 20–55)
IRON SERPL-MCNC: 21 UG/DL (ref 37–145)
LDLC SERPL CALC-MCNC: 102 MG/DL (ref 0–100)
LYMPHOCYTES NFR BLD: 2.35 K/UL (ref 1.1–3.7)
LYMPHOCYTES RELATIVE PERCENT: 29 % (ref 24–43)
MCH RBC QN AUTO: 20.1 PG (ref 25.2–33.5)
MCHC RBC AUTO-ENTMCNC: 28 G/DL (ref 28.4–34.8)
MCV RBC AUTO: 71.7 FL (ref 82.6–102.9)
MONOCYTES NFR BLD: 0.57 K/UL (ref 0.1–1.2)
MONOCYTES NFR BLD: 7 % (ref 3–12)
MORPHOLOGY: ABNORMAL
MORPHOLOGY: ABNORMAL
NEUTROPHILS NFR BLD: 61 % (ref 36–65)
NEUTS SEG NFR BLD: 4.94 K/UL (ref 1.5–8.1)
NRBC BLD-RTO: 0 PER 100 WBC
PLATELET # BLD AUTO: 325 K/UL (ref 138–453)
PMV BLD AUTO: 9.3 FL (ref 8.1–13.5)
POTASSIUM SERPL-SCNC: 4.2 MMOL/L (ref 3.7–5.3)
PROT SERPL-MCNC: 7.1 G/DL (ref 6.6–8.7)
RBC # BLD AUTO: 4.92 M/UL (ref 3.95–5.11)
SODIUM SERPL-SCNC: 139 MMOL/L (ref 136–145)
TIBC SERPL-MCNC: 433 UG/DL (ref 250–450)
TRIGL SERPL-MCNC: 112 MG/DL (ref 0–149)
TSH SERPL DL<=0.05 MIU/L-ACNC: 1.61 UIU/ML (ref 0.27–4.2)
UNSATURATED IRON BINDING CAPACITY: 412 UG/DL (ref 112–347)
VIT B12 SERPL-MCNC: 325 PG/ML (ref 232–1245)
VLDLC SERPL CALC-MCNC: 22 MG/DL (ref 1–30)
WBC OTHER # BLD: 8.1 K/UL (ref 3.5–11.3)

## 2025-05-05 RX ORDER — PHENTERMINE HYDROCHLORIDE 37.5 MG/1
37.5 CAPSULE ORAL EVERY MORNING
Status: CANCELLED | OUTPATIENT
Start: 2025-05-05

## 2025-05-05 RX ORDER — PHENTERMINE HYDROCHLORIDE 37.5 MG/1
CAPSULE ORAL
COMMUNITY
Start: 2025-03-11

## 2025-05-05 SDOH — ECONOMIC STABILITY: FOOD INSECURITY: WITHIN THE PAST 12 MONTHS, THE FOOD YOU BOUGHT JUST DIDN'T LAST AND YOU DIDN'T HAVE MONEY TO GET MORE.: NEVER TRUE

## 2025-05-05 SDOH — ECONOMIC STABILITY: FOOD INSECURITY: WITHIN THE PAST 12 MONTHS, YOU WORRIED THAT YOUR FOOD WOULD RUN OUT BEFORE YOU GOT MONEY TO BUY MORE.: NEVER TRUE

## 2025-05-05 ASSESSMENT — PATIENT HEALTH QUESTIONNAIRE - PHQ9
SUM OF ALL RESPONSES TO PHQ QUESTIONS 1-9: 5
2. FEELING DOWN, DEPRESSED OR HOPELESS: NOT AT ALL
9. THOUGHTS THAT YOU WOULD BE BETTER OFF DEAD, OR OF HURTING YOURSELF: NOT AT ALL
10. IF YOU CHECKED OFF ANY PROBLEMS, HOW DIFFICULT HAVE THESE PROBLEMS MADE IT FOR YOU TO DO YOUR WORK, TAKE CARE OF THINGS AT HOME, OR GET ALONG WITH OTHER PEOPLE: NOT DIFFICULT AT ALL
4. FEELING TIRED OR HAVING LITTLE ENERGY: NEARLY EVERY DAY
5. POOR APPETITE OR OVEREATING: SEVERAL DAYS
SUM OF ALL RESPONSES TO PHQ QUESTIONS 1-9: 5
6. FEELING BAD ABOUT YOURSELF - OR THAT YOU ARE A FAILURE OR HAVE LET YOURSELF OR YOUR FAMILY DOWN: NOT AT ALL
SUM OF ALL RESPONSES TO PHQ QUESTIONS 1-9: 5
3. TROUBLE FALLING OR STAYING ASLEEP: SEVERAL DAYS
8. MOVING OR SPEAKING SO SLOWLY THAT OTHER PEOPLE COULD HAVE NOTICED. OR THE OPPOSITE, BEING SO FIGETY OR RESTLESS THAT YOU HAVE BEEN MOVING AROUND A LOT MORE THAN USUAL: NOT AT ALL
1. LITTLE INTEREST OR PLEASURE IN DOING THINGS: NOT AT ALL
7. TROUBLE CONCENTRATING ON THINGS, SUCH AS READING THE NEWSPAPER OR WATCHING TELEVISION: NOT AT ALL
SUM OF ALL RESPONSES TO PHQ QUESTIONS 1-9: 5

## 2025-05-05 NOTE — PROGRESS NOTES
[facility]  [address]  [address]  2025    Susannah Caba is a 39 y.o. female who presents today for her medical conditions and/or complaints as noted below.    Susannah Caba is scheduled today for Fatigue and Sleep Problem (Just want to sleep)  .      HPI:     History of Present Illness  The patient presents for evaluation of fatigue.    They report significant fatigue since discontinuing Adipex, with severe episodes occurring yesterday and the day before. Fatigue has persisted for 1.5 months despite B12 injections and daily supplements. They requested an Adipex refill but obtained some from their cousin due to prescription issues. They are uncertain if this will impact their blood work.    They report occasional snoring attributed to extreme fatigue. They have a history of insomnia and are a light sleeper. They believe Adipex provided mental rest. They considered an ADHD evaluation but were deterred by their mother's account of their brother's experiences.      Vitals:    25 1310   BP: 126/89   Pulse: 84   SpO2: 100%   Weight: 80.3 kg (177 lb)   Height: 1.6 m (5' 3\")      Past Medical History:   Diagnosis Date    Bipolar disorder (HCC)     Lumbago     Nephrolithiasis     Obesity     Syncope, cardiogenic       Past Surgical History:   Procedure Laterality Date     SECTION      DILATION AND CURETTAGE OF UTERUS       Family History   Problem Relation Age of Onset    Diabetes Other     Heart Disease Other     Cancer Other      Social History     Tobacco Use    Smoking status: Never    Smokeless tobacco: Never   Substance Use Topics    Alcohol use: No      Current Outpatient Medications   Medication Sig Dispense Refill    phentermine 37.5 MG capsule TAKE 1 CAPSULE BY MOUTH EVERY MORNING MAX DAILY AMOUNT: 37.5 MG      omeprazole (PRILOSEC) 20 MG delayed release capsule Take 1 capsule by mouth every morning (before breakfast) 30 capsule 2    buPROPion (WELLBUTRIN XL)

## 2025-05-08 ENCOUNTER — TELEPHONE (OUTPATIENT)
Dept: FAMILY MEDICINE CLINIC | Age: 40
End: 2025-05-08

## 2025-05-09 ENCOUNTER — RESULTS FOLLOW-UP (OUTPATIENT)
Dept: FAMILY MEDICINE CLINIC | Age: 40
End: 2025-05-09

## 2025-05-09 DIAGNOSIS — D50.0 IRON DEFICIENCY ANEMIA DUE TO CHRONIC BLOOD LOSS: Chronic | ICD-10-CM

## 2025-05-09 RX ORDER — FERROUS SULFATE 325(65) MG
325 TABLET ORAL 2 TIMES DAILY
Qty: 60 TABLET | Refills: 5 | Status: SHIPPED | OUTPATIENT
Start: 2025-05-09 | End: 2025-05-13 | Stop reason: SINTOL

## 2025-05-09 ASSESSMENT — ENCOUNTER SYMPTOMS
SINUS PRESSURE: 0
CHEST TIGHTNESS: 0
SINUS PAIN: 0
DIARRHEA: 0
NAUSEA: 0
SHORTNESS OF BREATH: 0
EYE REDNESS: 0
WHEEZING: 0
SORE THROAT: 0
TROUBLE SWALLOWING: 0
EYE ITCHING: 0
ABDOMINAL PAIN: 0
COUGH: 0
VOMITING: 0
EYE DISCHARGE: 0

## 2025-05-12 RX ORDER — PHENTERMINE HYDROCHLORIDE 37.5 MG/1
CAPSULE ORAL
Status: CANCELLED | OUTPATIENT
Start: 2025-05-12

## 2025-05-12 NOTE — TELEPHONE ENCOUNTER
Spoke with Dr. Orozco and she stated to have the patient try taking Miralax with the iron tablet. Patient informed and verbally understood

## 2025-05-12 NOTE — TELEPHONE ENCOUNTER
Susannah Caba is calling to request a refill on the following medication(s):    Medication Request:  Requested Prescriptions     Pending Prescriptions Disp Refills    phentermine 37.5 MG capsule         Last Visit Date (If Applicable):  5/5/2025    Next Visit Date:    Visit date not found

## 2025-05-12 NOTE — TELEPHONE ENCOUNTER
Patient states that she can't take iron tablets because it causes her to become constipated. Patient states that the cause of her fatigue is due to her low iron and she doesn't know what to do. Please advise

## 2025-05-13 DIAGNOSIS — D50.9 IRON DEFICIENCY ANEMIA, UNSPECIFIED IRON DEFICIENCY ANEMIA TYPE: Primary | ICD-10-CM

## 2025-05-13 RX ORDER — FERRIC CARBOXYMALTOSE INJECTION 50 MG/ML
750 INJECTION, SOLUTION INTRAVENOUS ONCE
Qty: 15 ML | Refills: 0 | Status: SHIPPED | OUTPATIENT
Start: 2025-05-13 | End: 2025-05-13

## 2025-05-13 NOTE — TELEPHONE ENCOUNTER
Per Iker Salazar. I discussed that with her at the last visit. We are holding off the adipex until we get the low iron and Hgb corrected.

## 2025-05-27 ENCOUNTER — TELEPHONE (OUTPATIENT)
Dept: FAMILY MEDICINE CLINIC | Age: 40
End: 2025-05-27

## 2025-05-27 DIAGNOSIS — D50.0 IRON DEFICIENCY ANEMIA DUE TO CHRONIC BLOOD LOSS: Primary | ICD-10-CM

## 2025-05-27 NOTE — TELEPHONE ENCOUNTER
Injectafer is non preferred - preferred meds are ferrlicit, feraheme, infed and venofer. A new referral will need to be placed.

## 2025-05-30 ENCOUNTER — TELEPHONE (OUTPATIENT)
Dept: FAMILY MEDICINE CLINIC | Age: 40
End: 2025-05-30

## 2025-05-30 NOTE — TELEPHONE ENCOUNTER
Infusion medications needs to be changed to preferred medications. Ferrlecit, Venofer or Fereheme.

## 2025-06-04 RX ORDER — HEPARIN 100 UNIT/ML
500 SYRINGE INTRAVENOUS PRN
OUTPATIENT
Start: 2025-06-04

## 2025-06-04 RX ORDER — ACETAMINOPHEN 325 MG/1
650 TABLET ORAL
OUTPATIENT
Start: 2025-06-04

## 2025-06-04 RX ORDER — EPINEPHRINE 1 MG/ML
0.3 INJECTION, SOLUTION INTRAMUSCULAR; SUBCUTANEOUS PRN
OUTPATIENT
Start: 2025-06-04

## 2025-06-04 RX ORDER — SODIUM CHLORIDE 9 MG/ML
5-250 INJECTION, SOLUTION INTRAVENOUS PRN
OUTPATIENT
Start: 2025-06-04

## 2025-06-04 RX ORDER — SODIUM CHLORIDE 9 MG/ML
INJECTION, SOLUTION INTRAVENOUS CONTINUOUS
OUTPATIENT
Start: 2025-06-04

## 2025-06-04 RX ORDER — ALBUTEROL SULFATE 90 UG/1
4 INHALANT RESPIRATORY (INHALATION) PRN
OUTPATIENT
Start: 2025-06-04

## 2025-06-04 RX ORDER — HYDROCORTISONE SODIUM SUCCINATE 100 MG/2ML
100 INJECTION INTRAMUSCULAR; INTRAVENOUS
OUTPATIENT
Start: 2025-06-04

## 2025-06-04 RX ORDER — SODIUM CHLORIDE 0.9 % (FLUSH) 0.9 %
5-40 SYRINGE (ML) INJECTION PRN
OUTPATIENT
Start: 2025-06-04

## 2025-06-04 RX ORDER — DIPHENHYDRAMINE HYDROCHLORIDE 50 MG/ML
50 INJECTION, SOLUTION INTRAMUSCULAR; INTRAVENOUS
OUTPATIENT
Start: 2025-06-04

## 2025-06-04 RX ORDER — ONDANSETRON 2 MG/ML
8 INJECTION INTRAMUSCULAR; INTRAVENOUS
OUTPATIENT
Start: 2025-06-04

## 2025-06-06 ENCOUNTER — OFFICE VISIT (OUTPATIENT)
Dept: FAMILY MEDICINE CLINIC | Age: 40
End: 2025-06-06
Payer: MEDICAID

## 2025-06-06 VITALS
BODY MASS INDEX: 33.17 KG/M2 | HEIGHT: 63 IN | OXYGEN SATURATION: 98 % | DIASTOLIC BLOOD PRESSURE: 86 MMHG | SYSTOLIC BLOOD PRESSURE: 132 MMHG | WEIGHT: 187.2 LBS | HEART RATE: 78 BPM

## 2025-06-06 DIAGNOSIS — D50.0 IRON DEFICIENCY ANEMIA DUE TO CHRONIC BLOOD LOSS: Primary | ICD-10-CM

## 2025-06-06 DIAGNOSIS — R06.02 SHORTNESS OF BREATH: ICD-10-CM

## 2025-06-06 PROCEDURE — 99214 OFFICE O/P EST MOD 30 MIN: CPT

## 2025-06-06 NOTE — PROGRESS NOTES
Expected Date:   6/6/2025     Expiration Date:   6/6/2026     No orders of the defined types were placed in this encounter.      The patient (or guardian, if applicable) and other individuals in attendance with the patient were advised that Artificial Intelligence will be utilized during this visit to record, process the conversation to generate a clinical note, and support improvement of the AI technology. The patient (or guardian, if applicable) and other individuals in attendance at the appointment consented to the use of AI, including the recording.      Reviewed health maintenance, prior labs and imaging.   Patient given educational materials - see patient instructions.    Discussed use, benefit, and side effects of prescribed medications. Barriers to medication compliance addressed.   All patient questions answered.  Pt voiced understanding to plan of care.   Instructed to continue medications as discussed, healthy diet and exercise.    Patient agreed with treatment plan. Follow up as directed below.     This note is created with the assistance of a speech-recognition program. While intending to generate a document that actually reflects the content of the visit, no guarantees can be provided that every mistake has been identified and corrected by editing.    Electronically signed by NICKO Diaz CNP, APRN-CNP on 6/12/2025 at 4:31 PM

## 2025-06-09 ENCOUNTER — TELEPHONE (OUTPATIENT)
Dept: INFUSION THERAPY | Age: 40
End: 2025-06-09

## 2025-06-12 ASSESSMENT — ENCOUNTER SYMPTOMS
SINUS PRESSURE: 0
NAUSEA: 0
SINUS PAIN: 0
EYE ITCHING: 0
ABDOMINAL PAIN: 0
SHORTNESS OF BREATH: 1
VOMITING: 0
CHEST TIGHTNESS: 0
COUGH: 0
EYE DISCHARGE: 0
DIARRHEA: 0
EYE REDNESS: 0
SORE THROAT: 0
WHEEZING: 0
TROUBLE SWALLOWING: 0

## 2025-06-16 ENCOUNTER — PATIENT MESSAGE (OUTPATIENT)
Dept: FAMILY MEDICINE CLINIC | Age: 40
End: 2025-06-16

## 2025-06-16 ENCOUNTER — HOSPITAL ENCOUNTER (OUTPATIENT)
Dept: INFUSION THERAPY | Age: 40
Discharge: HOME | End: 2025-06-16
Payer: MEDICAID

## 2025-06-16 VITALS
OXYGEN SATURATION: 99 % | SYSTOLIC BLOOD PRESSURE: 131 MMHG | RESPIRATION RATE: 18 BRPM | TEMPERATURE: 97.5 F | DIASTOLIC BLOOD PRESSURE: 81 MMHG | HEART RATE: 68 BPM

## 2025-06-16 DIAGNOSIS — D50.0 IRON DEFICIENCY ANEMIA DUE TO CHRONIC BLOOD LOSS: Primary | ICD-10-CM

## 2025-06-16 PROCEDURE — 96365 THER/PROPH/DIAG IV INF INIT: CPT

## 2025-06-16 PROCEDURE — 2580000003 HC RX 258

## 2025-06-16 PROCEDURE — 6360000002 HC RX W HCPCS

## 2025-06-16 RX ORDER — ACETAMINOPHEN 325 MG/1
650 TABLET ORAL
OUTPATIENT
Start: 2025-06-16

## 2025-06-16 RX ORDER — SODIUM CHLORIDE 9 MG/ML
INJECTION, SOLUTION INTRAVENOUS CONTINUOUS
OUTPATIENT
Start: 2025-06-16

## 2025-06-16 RX ORDER — SODIUM CHLORIDE 9 MG/ML
5-250 INJECTION, SOLUTION INTRAVENOUS PRN
Status: DISCONTINUED | OUTPATIENT
Start: 2025-06-16 | End: 2025-06-17 | Stop reason: HOSPADM

## 2025-06-16 RX ORDER — DIPHENHYDRAMINE HYDROCHLORIDE 50 MG/ML
50 INJECTION, SOLUTION INTRAMUSCULAR; INTRAVENOUS
OUTPATIENT
Start: 2025-06-16

## 2025-06-16 RX ORDER — EPINEPHRINE 1 MG/ML
0.3 INJECTION, SOLUTION, CONCENTRATE INTRAVENOUS PRN
OUTPATIENT
Start: 2025-06-16

## 2025-06-16 RX ORDER — SODIUM CHLORIDE 9 MG/ML
5-250 INJECTION, SOLUTION INTRAVENOUS PRN
OUTPATIENT
Start: 2025-06-16

## 2025-06-16 RX ORDER — ONDANSETRON 2 MG/ML
8 INJECTION INTRAMUSCULAR; INTRAVENOUS
OUTPATIENT
Start: 2025-06-16

## 2025-06-16 RX ORDER — HEPARIN 100 UNIT/ML
500 SYRINGE INTRAVENOUS PRN
OUTPATIENT
Start: 2025-06-16

## 2025-06-16 RX ORDER — SODIUM CHLORIDE 0.9 % (FLUSH) 0.9 %
5-40 SYRINGE (ML) INJECTION PRN
OUTPATIENT
Start: 2025-06-16

## 2025-06-16 RX ORDER — ALBUTEROL SULFATE 90 UG/1
4 INHALANT RESPIRATORY (INHALATION) PRN
OUTPATIENT
Start: 2025-06-16

## 2025-06-16 RX ORDER — HYDROCORTISONE SODIUM SUCCINATE 100 MG/2ML
100 INJECTION INTRAMUSCULAR; INTRAVENOUS
OUTPATIENT
Start: 2025-06-16

## 2025-06-16 RX ORDER — FAMOTIDINE 10 MG/ML
20 INJECTION, SOLUTION INTRAVENOUS
OUTPATIENT
Start: 2025-06-16

## 2025-06-16 RX ADMIN — SODIUM CHLORIDE 150 ML/HR: 0.9 INJECTION, SOLUTION INTRAVENOUS at 11:01

## 2025-06-16 RX ADMIN — IRON SUCROSE 200 MG: 20 INJECTION, SOLUTION INTRAVENOUS at 11:03

## 2025-06-16 NOTE — PROGRESS NOTES
Pt here for Venofer 1/1  Arrives ambulatory.  Denies any new complaints.  Tx complete without incident.  Pt observed for 30 min after infusion completed.  Pt d/c'd in stable condition.

## 2025-07-09 ENCOUNTER — TELEPHONE (OUTPATIENT)
Dept: INFUSION THERAPY | Age: 40
End: 2025-07-09

## 2025-07-09 NOTE — TELEPHONE ENCOUNTER
Called and LVM to schedule consult.     Electronically signed by Ingrid Gudino MA on 7/9/2025 at 2:20 PM

## 2025-07-29 ENCOUNTER — INITIAL CONSULT (OUTPATIENT)
Age: 40
End: 2025-07-29
Payer: MEDICAID

## 2025-07-29 VITALS
WEIGHT: 190.5 LBS | OXYGEN SATURATION: 98 % | RESPIRATION RATE: 18 BRPM | HEART RATE: 62 BPM | TEMPERATURE: 97.1 F | SYSTOLIC BLOOD PRESSURE: 125 MMHG | BODY MASS INDEX: 33.75 KG/M2 | DIASTOLIC BLOOD PRESSURE: 83 MMHG

## 2025-07-29 DIAGNOSIS — D50.0 IRON DEFICIENCY ANEMIA DUE TO CHRONIC BLOOD LOSS: Primary | ICD-10-CM

## 2025-07-29 DIAGNOSIS — K90.9 IRON MALABSORPTION: ICD-10-CM

## 2025-07-29 PROCEDURE — 99214 OFFICE O/P EST MOD 30 MIN: CPT | Performed by: INTERNAL MEDICINE

## 2025-07-29 PROCEDURE — 99244 OFF/OP CNSLTJ NEW/EST MOD 40: CPT | Performed by: INTERNAL MEDICINE

## 2025-07-29 RX ORDER — HYDROCORTISONE SODIUM SUCCINATE 100 MG/2ML
100 INJECTION INTRAMUSCULAR; INTRAVENOUS
OUTPATIENT
Start: 2025-08-05

## 2025-07-29 RX ORDER — MULTIVITAMIN WITH IRON
500 TABLET ORAL DAILY
Qty: 30 TABLET | Refills: 3 | Status: SHIPPED | OUTPATIENT
Start: 2025-07-29 | End: 2026-07-29

## 2025-07-29 RX ORDER — SODIUM CHLORIDE 9 MG/ML
5-250 INJECTION, SOLUTION INTRAVENOUS PRN
OUTPATIENT
Start: 2025-08-05

## 2025-07-29 RX ORDER — ALBUTEROL SULFATE 90 UG/1
4 INHALANT RESPIRATORY (INHALATION) PRN
OUTPATIENT
Start: 2025-08-05

## 2025-07-29 RX ORDER — SODIUM CHLORIDE 9 MG/ML
INJECTION, SOLUTION INTRAVENOUS PRN
OUTPATIENT
Start: 2025-08-05

## 2025-07-29 RX ORDER — FAMOTIDINE 10 MG/ML
20 INJECTION, SOLUTION INTRAVENOUS
OUTPATIENT
Start: 2025-08-05

## 2025-07-29 RX ORDER — EPINEPHRINE 1 MG/ML
0.3 INJECTION, SOLUTION, CONCENTRATE INTRAVENOUS PRN
OUTPATIENT
Start: 2025-08-05

## 2025-07-29 RX ORDER — HEPARIN SODIUM (PORCINE) LOCK FLUSH IV SOLN 100 UNIT/ML 100 UNIT/ML
500 SOLUTION INTRAVENOUS PRN
OUTPATIENT
Start: 2025-08-05

## 2025-07-29 RX ORDER — DIPHENHYDRAMINE HYDROCHLORIDE 50 MG/ML
50 INJECTION, SOLUTION INTRAMUSCULAR; INTRAVENOUS
OUTPATIENT
Start: 2025-08-05

## 2025-07-29 RX ORDER — SODIUM CHLORIDE 0.9 % (FLUSH) 0.9 %
5-40 SYRINGE (ML) INJECTION PRN
OUTPATIENT
Start: 2025-08-05

## 2025-07-29 RX ORDER — ONDANSETRON 2 MG/ML
8 INJECTION INTRAMUSCULAR; INTRAVENOUS
OUTPATIENT
Start: 2025-08-05

## 2025-07-29 RX ORDER — ACETAMINOPHEN 325 MG/1
650 TABLET ORAL
OUTPATIENT
Start: 2025-08-05

## 2025-07-29 NOTE — PROGRESS NOTES
DIAGNOSIS:   Anemia  Workup suggest iron deficiency anemia from menometrorrhagia  CURRENT THERAPY:  Workup in progress  BRIEF CASE HISTORY:   Susannah Caba is a very pleasant 39 y.o. female who is referred to us for worsening anemia.      History of Present Illness  The patient presents for evaluation of anemia.    She has a history of iron deficiency dating back to her youth, characterized by heavy menstrual bleeding with large clots. This condition has led to episodes of fainting, known as neurocardiogenic syncope, which can occur before, during, or after her menstrual cycle. Despite attempts to manage this with increased water and salt intake, she has found no relief. Recently, she has been experiencing difficulty breathing and extreme fatigue, to the point where she struggles to stay awake. Her primary care physician ordered blood work and prescribed an iron infusion, but also recommended a consultation with a hematologist.    She has received numerous blood transfusions in the past, with the most recent one occurring around 2015 following a miscarriage that resulted in significant blood loss. She has not received any intravenous iron treatments prior to this year and has not been taking over-the-counter iron supplements or vitamins. She has been trying to manage her condition through dietary changes, but her symptoms have been fluctuating, sometimes improving and other times worsening. She is currently not taking any iron tablets due to digestive issues that cause constipation and vomiting. She has chronic constipation and can go up to a week without having a bowel movement. She occasionally experiences bleeding during bowel movements, which she attributes to hemorrhoids. She also reports occasional vomiting with blood, which she believes may be related to her weight gain.    She has consulted a gastroenterologist and underwent a gastric emptying study and colonoscopy, both of which were normal. She

## 2025-07-31 ENCOUNTER — OFFICE VISIT (OUTPATIENT)
Dept: FAMILY MEDICINE CLINIC | Age: 40
End: 2025-07-31
Payer: MEDICAID

## 2025-07-31 VITALS
SYSTOLIC BLOOD PRESSURE: 112 MMHG | BODY MASS INDEX: 33.13 KG/M2 | WEIGHT: 187 LBS | TEMPERATURE: 97 F | DIASTOLIC BLOOD PRESSURE: 60 MMHG | HEIGHT: 63 IN | HEART RATE: 87 BPM | OXYGEN SATURATION: 98 %

## 2025-07-31 DIAGNOSIS — E66.09 CLASS 1 OBESITY DUE TO EXCESS CALORIES WITHOUT SERIOUS COMORBIDITY WITH BODY MASS INDEX (BMI) OF 33.0 TO 33.9 IN ADULT: Primary | Chronic | ICD-10-CM

## 2025-07-31 DIAGNOSIS — E66.811 CLASS 1 OBESITY DUE TO EXCESS CALORIES WITHOUT SERIOUS COMORBIDITY WITH BODY MASS INDEX (BMI) OF 33.0 TO 33.9 IN ADULT: Primary | Chronic | ICD-10-CM

## 2025-07-31 DIAGNOSIS — K21.9 GASTROESOPHAGEAL REFLUX DISEASE WITHOUT ESOPHAGITIS: Chronic | ICD-10-CM

## 2025-07-31 PROCEDURE — 99214 OFFICE O/P EST MOD 30 MIN: CPT

## 2025-07-31 RX ORDER — PHENTERMINE HYDROCHLORIDE 15 MG/1
15 CAPSULE ORAL EVERY MORNING
Qty: 30 CAPSULE | Refills: 0 | Status: SHIPPED | OUTPATIENT
Start: 2025-07-31 | End: 2025-08-30

## 2025-07-31 RX ORDER — OMEPRAZOLE 20 MG/1
20 CAPSULE, DELAYED RELEASE ORAL
Qty: 30 CAPSULE | Refills: 2 | Status: SHIPPED | OUTPATIENT
Start: 2025-07-31

## 2025-07-31 ASSESSMENT — ENCOUNTER SYMPTOMS
EYE REDNESS: 0
NAUSEA: 0
SINUS PRESSURE: 0
COUGH: 0
SHORTNESS OF BREATH: 0
WHEEZING: 0
SORE THROAT: 0
VOMITING: 0
CHEST TIGHTNESS: 0
DIARRHEA: 0
TROUBLE SWALLOWING: 0
SINUS PAIN: 0
ABDOMINAL PAIN: 0
EYE ITCHING: 0
EYE DISCHARGE: 0

## 2025-07-31 NOTE — PROGRESS NOTES
[facility]   [address] [facility]  [address]  2025    Susannah Caba is a 39 y.o. female who presents today for her medical conditions and/or complaints as noted below.    Susannah Caba is scheduled today for Weight Gain  .      HPI:     History of Present Illness  The patient presents for weight gain, back pain, and heavy menstrual bleeding.    They report significant weight gain due to severe back pain limiting exercise. The weight gain makes it difficult to climb stairs, and their clothes feel tighter. Eating habits remain unchanged. They seek a solution to manage their weight.    They consulted a hematologist who recommended three additional IV iron infusions and prescribed vitamin B12. The hematologist advised a follow-up blood test in three months. During the first iron infusion, they experienced dizziness and nausea but completed the dose after a brief pause.    They saw a gynecologist at [facility], who suggested a follow-up after the hematologist consultation. They have not yet returned to the gynecologist.    GYNECOLOGICAL HISTORY:  Reports heavy menstrual bleeding.    Patient has no further concerns for today's visit.  Previous notes reviewed in the chart.      Vitals:    25 1440   BP: 112/60   Pulse: 87   Temp: 97 °F (36.1 °C)   SpO2: 98%   Weight: 84.8 kg (187 lb)   Height: 1.6 m (5' 2.99\")      Past Medical History:   Diagnosis Date    Bipolar disorder (HCC)     Lumbago     Nephrolithiasis     Obesity     Syncope, cardiogenic       Past Surgical History:   Procedure Laterality Date     SECTION      DILATION AND CURETTAGE OF UTERUS       Family History   Problem Relation Age of Onset    Diabetes Other     Heart Disease Other     Cancer Other      Social History     Tobacco Use    Smoking status: Never    Smokeless tobacco: Never   Substance Use Topics    Alcohol use: No      Current Outpatient Medications   Medication Sig Dispense Refill    phentermine 15 MG

## 2025-08-04 ENCOUNTER — TELEPHONE (OUTPATIENT)
Dept: INFUSION THERAPY | Age: 40
End: 2025-08-04

## 2025-08-20 DIAGNOSIS — D50.0 IRON DEFICIENCY ANEMIA DUE TO CHRONIC BLOOD LOSS: ICD-10-CM

## 2025-08-20 DIAGNOSIS — K90.9 IRON MALABSORPTION: ICD-10-CM

## 2025-08-21 DIAGNOSIS — D50.0 IRON DEFICIENCY ANEMIA DUE TO CHRONIC BLOOD LOSS: ICD-10-CM

## 2025-08-21 LAB
FERRITIN: 5.5 NG/ML (ref 9–150)
FOLATE: 8.38
IRON: 39
TOTAL IRON BINDING CAPACITY: 420
VITAMIN B-12: 376

## 2025-09-04 ENCOUNTER — HOSPITAL ENCOUNTER (OUTPATIENT)
Dept: INFUSION THERAPY | Age: 40
Discharge: HOME | End: 2025-09-04
Payer: MEDICAID

## 2025-09-04 VITALS
SYSTOLIC BLOOD PRESSURE: 114 MMHG | HEART RATE: 55 BPM | RESPIRATION RATE: 16 BRPM | TEMPERATURE: 98.6 F | DIASTOLIC BLOOD PRESSURE: 72 MMHG

## 2025-09-04 DIAGNOSIS — D50.0 IRON DEFICIENCY ANEMIA DUE TO CHRONIC BLOOD LOSS: ICD-10-CM

## 2025-09-04 DIAGNOSIS — K90.9 IRON MALABSORPTION: Primary | ICD-10-CM

## 2025-09-04 DIAGNOSIS — E53.8 B12 DEFICIENCY: ICD-10-CM

## 2025-09-04 PROCEDURE — 96365 THER/PROPH/DIAG IV INF INIT: CPT

## 2025-09-04 PROCEDURE — 6360000002 HC RX W HCPCS: Performed by: INTERNAL MEDICINE

## 2025-09-04 PROCEDURE — 2580000003 HC RX 258: Performed by: INTERNAL MEDICINE

## 2025-09-04 RX ORDER — FAMOTIDINE 10 MG/ML
20 INJECTION, SOLUTION INTRAVENOUS
OUTPATIENT
Start: 2025-09-18

## 2025-09-04 RX ORDER — SODIUM CHLORIDE 0.9 % (FLUSH) 0.9 %
5-40 SYRINGE (ML) INJECTION PRN
OUTPATIENT
Start: 2025-09-18

## 2025-09-04 RX ORDER — SODIUM CHLORIDE 9 MG/ML
5-250 INJECTION, SOLUTION INTRAVENOUS PRN
OUTPATIENT
Start: 2025-09-18

## 2025-09-04 RX ORDER — SODIUM CHLORIDE 9 MG/ML
INJECTION, SOLUTION INTRAVENOUS PRN
OUTPATIENT
Start: 2025-09-18

## 2025-09-04 RX ORDER — ALBUTEROL SULFATE 90 UG/1
4 INHALANT RESPIRATORY (INHALATION) PRN
OUTPATIENT
Start: 2025-09-18

## 2025-09-04 RX ORDER — EPINEPHRINE 1 MG/ML
0.3 INJECTION, SOLUTION, CONCENTRATE INTRAVENOUS PRN
OUTPATIENT
Start: 2025-09-18

## 2025-09-04 RX ORDER — ACETAMINOPHEN 325 MG/1
650 TABLET ORAL
OUTPATIENT
Start: 2025-09-18

## 2025-09-04 RX ORDER — DIPHENHYDRAMINE HYDROCHLORIDE 50 MG/ML
50 INJECTION, SOLUTION INTRAMUSCULAR; INTRAVENOUS
OUTPATIENT
Start: 2025-09-18

## 2025-09-04 RX ORDER — ONDANSETRON 2 MG/ML
8 INJECTION INTRAMUSCULAR; INTRAVENOUS
OUTPATIENT
Start: 2025-09-18

## 2025-09-04 RX ORDER — SODIUM CHLORIDE 9 MG/ML
5-250 INJECTION, SOLUTION INTRAVENOUS PRN
Status: DISCONTINUED | OUTPATIENT
Start: 2025-09-04 | End: 2025-09-05 | Stop reason: HOSPADM

## 2025-09-04 RX ORDER — HYDROCORTISONE SODIUM SUCCINATE 100 MG/2ML
100 INJECTION INTRAMUSCULAR; INTRAVENOUS
OUTPATIENT
Start: 2025-09-18

## 2025-09-04 RX ORDER — HEPARIN 100 UNIT/ML
500 SYRINGE INTRAVENOUS PRN
OUTPATIENT
Start: 2025-09-18

## 2025-09-04 RX ADMIN — SODIUM CHLORIDE 250 ML/HR: 0.9 INJECTION, SOLUTION INTRAVENOUS at 11:31

## 2025-09-04 RX ADMIN — IRON SUCROSE 300 MG: 20 INJECTION, SOLUTION INTRAVENOUS at 11:32
